# Patient Record
Sex: FEMALE | Race: WHITE
[De-identification: names, ages, dates, MRNs, and addresses within clinical notes are randomized per-mention and may not be internally consistent; named-entity substitution may affect disease eponyms.]

---

## 2018-01-25 ENCOUNTER — HOSPITAL ENCOUNTER (EMERGENCY)
Dept: HOSPITAL 80 - FED | Age: 23
Discharge: HOME | End: 2018-01-25
Payer: COMMERCIAL

## 2018-01-25 VITALS
DIASTOLIC BLOOD PRESSURE: 55 MMHG | HEART RATE: 70 BPM | TEMPERATURE: 98.4 F | RESPIRATION RATE: 16 BRPM | SYSTOLIC BLOOD PRESSURE: 117 MMHG

## 2018-01-25 VITALS — OXYGEN SATURATION: 98 %

## 2018-01-25 DIAGNOSIS — B96.20: ICD-10-CM

## 2018-01-25 DIAGNOSIS — N39.0: Primary | ICD-10-CM

## 2018-01-25 DIAGNOSIS — E86.9: ICD-10-CM

## 2018-01-25 LAB — PLATELET # BLD: 296 10^3/UL (ref 150–400)

## 2018-01-25 NOTE — EDPHY
HPI/HX/ROS/PE/MDM


Narrative: 





CHIEF COMPLAINT: RLQ abdominal pain





HPI: The patient is a 21 y/o female arriving with her friend complaining of 

progressively worsening RLQ pain onset at 03:00 this morning, about 5 hours 

ago. Her pain is sharp in quality and worse with palpation, walking, and 

coughing. The pain is always present and sometimes spikes in severity. She has 

associated nausea and constipation. She denies urinary symptoms, recent illness

, recent trauma. She is normally healthy and her last normal menstrual period 

was one week ago. Last PO intake 21:00, almost 11 hours ago. No history of 

abdominal surgeries. 





REVIEW OF SYSTEMS:


Aside from elements discussed in the HPI, a comprehensive 10-point review of 

systems was reviewed and is negative.





PMH: oral surgery, history of ovarian cyst 5-6 years ago





SOCIAL HISTORY: Friend at beside. [employer] student. From [place]. 





PHYSICAL EXAM:


General:Patient is alert, appears uncomfortable.


ENT:Eyes are normal to inspection.  ENT inspection normal.


Neck: Normal inspection.  Full range of motion.


Respiratory:No respiratory distress.  Breath sounds normal bilaterally.


Cardiovascular: Regular rate and rhythm.  Strong peripheral pulses.  Normal cap 

refill.


Abdomen:The abdomen has diffuse abdominal tenderness with severe RLQ tenderness 

to palpation. 


Back: Normal to inspection.  No tenderness to palpation.


Skin: Normal color.  No rash.  Warm and dry.


Extremities: Normal appearance.  Full range of motion.


Neuro: Oriented x3.  Normal motor function.  Normal sensory function.


ED Course: 





This is a normally healthy 21 y/o female who presents with a 5-hour history of 

acute onset RLQ abdominal pain and nausea. She has diffuse abdominal tenderness 

and severe RLQ tenderness that is concerning for acute appendicitis, though 

could also represent ovarian cyst or other process. Plan for IV, labs, 

abdominal CT, and pain management. 30mg IV Toradol and 1L IV NS administered. 





Abdominal CT is negative for acute process, appendix not visualized, but no 

secondary signs present. Moderate urothelial enhancement in the right ureter 

present. UA indicates UTI for which I have prescribed Keflex. She will receive 

1gm IV Rocephin here. Upon reassessment, she tells me this feels different than 

prior kidney infections. Will evaluate for ovarian torsion with pelvic US prior 

to discharge. 





US is negative. Reassessed patient and discussed findings. She will be 

discharged with script for Keflex and standard UTI care and follow up 

instructions. Urine has been sent for culture. Return precautions discussed. 

She is comfortable with plan for discharge. 





- Data Points


Imaging Results: 


 Imaging Impressions





Abdomen CT  01/25/18 08:52


Impression:  


1. Moderate stool in the proximal colon.


2. Mild urothelial enhancement in the right ureter, which can be seen with 

inflammation/infection.


3. Nonvisualization of the appendix with no secondary CT evidence of 

appendicitis.


 


Findings discussed with Choco Reyna MD on 1/25/2018 at 9:51 a.m.











Imaging: Discussed imaging studies w/ On call Radiologist, I viewed and 

interpreted images myself


Laboratory Results: 


 Laboratory Results





 01/25/18 07:20 





 01/25/18 07:20 





 











  01/25/18 01/25/18 01/25/18





  08:48 07:20 07:20


 


WBC      





    


 


RBC      





    


 


Hgb      





    


 


Hct      





    


 


MCV      





    


 


MCH      





    


 


MCHC      





    


 


RDW      





    


 


Plt Count      





    


 


MPV      





    


 


Neut % (Auto)      





    


 


Lymph % (Auto)      





    


 


Mono % (Auto)      





    


 


Eos % (Auto)      





    


 


Baso % (Auto)      





    


 


Nucleat RBC Rel Count      





    


 


Absolute Neuts (auto)      





    


 


Absolute Lymphs (auto)      





    


 


Absolute Monos (auto)      





    


 


Absolute Eos (auto)      





    


 


Absolute Basos (auto)      





    


 


Absolute Nucleated RBC      





    


 


Immature Gran %      





    


 


Immature Gran #      





    


 


Sodium      143 mEq/L mEq/L





     (135-145) 


 


Potassium      4.2 mEq/L mEq/L





     (3.5-5.2) 


 


Chloride      107 mEq/L mEq/L





     () 


 


Carbon Dioxide      22 mEq/l mEq/l





     (22-31) 


 


Anion Gap      14 mEq/L mEq/L





     (8-16) 


 


BUN      9 mg/dL mg/dL





     (7-23) 


 


Creatinine      0.8 mg/dL mg/dL





     (0.6-1.0) 


 


Estimated GFR      > 60 





    


 


Glucose      98 mg/dL mg/dL





     () 


 


Calcium      9.8 mg/dL mg/dL





     (8.5-10.4) 


 


Beta HCG, Qual    NEGATIVE   





    


 


Urine Color  YELLOW     





    


 


Urine Appearance  MODERATELY TURBID     





    


 


Urine pH  6.0     





   (5.0-7.5)   


 


Ur Specific Gravity  1.006     





   (1.002-1.030)   


 


Urine Protein  1+  H     





   (NEGATIVE)   


 


Urine Ketones  NEGATIVE     





   (NEGATIVE)   


 


Urine Blood  3+  H     





   (NEGATIVE)   


 


Urine Nitrate  POSITIVE  H     





   (NEGATIVE)   


 


Urine Bilirubin  NEGATIVE     





   (NEGATIVE)   


 


Urine Urobilinogen  NEGATIVE EU EU    





   (0.2-1.0)   


 


Ur Leukocyte Esterase  2+  H     





   (NEGATIVE)   


 


Urine RBC   /hpf H /hpf    





   (0-3)   


 


Urine WBC   /hpf H /hpf    





   (0-3)   


 


Ur Epithelial Cells  2+ /lpf H /lpf    





   (NONE-1+)   


 


Urine Bacteria  1+ /hpf H /hpf    





   (NONE SEEN)   


 


Urine Mucus  1+ /lpf /lpf    





   (NONE-1+)   


 


Urine Glucose  NEGATIVE     





   (NEGATIVE)   














  01/25/18





  07:20


 


WBC  9.68 10^3/uL H 10^3/uL





   (3.80-9.50) 


 


RBC  4.07 10^6/uL L 10^6/uL





   (4.18-5.33) 


 


Hgb  13.7 g/dL g/dL





   (12.6-16.3) 


 


Hct  38.5 % %





   (38.0-47.0) 


 


MCV  94.6 fL fL





   (81.5-99.8) 


 


MCH  33.7 pg pg





   (27.9-34.1) 


 


MCHC  35.6 g/dL g/dL





   (32.4-36.7) 


 


RDW  12.7 % %





   (11.5-15.2) 


 


Plt Count  296 10^3/uL 10^3/uL





   (150-400) 


 


MPV  10.1 fL fL





   (8.7-11.7) 


 


Neut % (Auto)  68.4 % %





   (39.3-74.2) 


 


Lymph % (Auto)  18.5 % %





   (15.0-45.0) 


 


Mono % (Auto)  8.2 % %





   (4.5-13.0) 


 


Eos % (Auto)  3.5 % %





   (0.6-7.6) 


 


Baso % (Auto)  1.0 % %





   (0.3-1.7) 


 


Nucleat RBC Rel Count  0.0 % %





   (0.0-0.2) 


 


Absolute Neuts (auto)  6.62 10^3/uL H 10^3/uL





   (1.70-6.50) 


 


Absolute Lymphs (auto)  1.79 10^3/uL 10^3/uL





   (1.00-3.00) 


 


Absolute Monos (auto)  0.79 10^3/uL 10^3/uL





   (0.30-0.80) 


 


Absolute Eos (auto)  0.34 10^3/uL 10^3/uL





   (0.03-0.40) 


 


Absolute Basos (auto)  0.10 10^3/uL 10^3/uL





   (0.02-0.10) 


 


Absolute Nucleated RBC  0.00 10^3/uL 10^3/uL





   (0-0.01) 


 


Immature Gran %  0.4 % %





   (0.0-1.1) 


 


Immature Gran #  0.04 10^3/uL 10^3/uL





   (0.00-0.10) 


 


Sodium  





  


 


Potassium  





  


 


Chloride  





  


 


Carbon Dioxide  





  


 


Anion Gap  





  


 


BUN  





  


 


Creatinine  





  


 


Estimated GFR  





  


 


Glucose  





  


 


Calcium  





  


 


Beta HCG, Qual  





  


 


Urine Color  





  


 


Urine Appearance  





  


 


Urine pH  





  


 


Ur Specific Gravity  





  


 


Urine Protein  





  


 


Urine Ketones  





  


 


Urine Blood  





  


 


Urine Nitrate  





  


 


Urine Bilirubin  





  


 


Urine Urobilinogen  





  


 


Ur Leukocyte Esterase  





  


 


Urine RBC  





  


 


Urine WBC  





  


 


Ur Epithelial Cells  





  


 


Urine Bacteria  





  


 


Urine Mucus  





  


 


Urine Glucose  





  











Medications Given: 


 








Discontinued Medications





Sodium Chloride (Ns)  1,000 mls @ 0 mls/hr IV ONCE ONE


   PRN Reason: Wide Open


   Stop: 01/25/18 07:34


   Last Admin: 01/25/18 07:36 Dose:  1,000 mls


Sodium Chloride (Ns)  1,000 mls @ 0 mls/hr IV EDNOW ONE; Wide Open


   PRN Reason: Protocol


   Stop: 01/25/18 08:23


   Last Admin: 01/25/18 08:34 Dose:  1,000 mls


Ketorolac Tromethamine (Toradol)  30 mg IVP EDNOW ONE


   Stop: 01/25/18 08:23


   Last Admin: 01/25/18 08:29 Dose:  30 mg








General


Time Seen by Provider: 01/25/18 07:38


Initial Vital Signs: 


 Initial Vital Signs











Temperature (C)  36.8 C   01/25/18 07:01


 


Heart Rate  85   01/25/18 07:01


 


Respiratory Rate  18   01/25/18 07:01


 


Blood Pressure  131/79 H  01/25/18 07:01


 


O2 Sat (%)  98   01/25/18 07:01








 











O2 Delivery Mode               Room Air














Allergies/Adverse Reactions: 


 





No Known Allergies Allergy (Unverified 01/25/18 07:01)


 











Departure





- Departure


Disposition: Home, Routine, Self-Care


Clinical Impression: 


Abdominal pain


Qualifiers:


 Abdominal location: right lower quadrant Qualified Code(s): R10.31 - Right 

lower quadrant pain





Urinary tract infection


Qualifiers:


 Urinary tract infection type: site unspecified Hematuria presence: with 

hematuria Qualified Code(s): N39.0 - Urinary tract infection, site not specified





Condition: Good


Instructions:  Cephalexin (By mouth), Constipation (ED), Urinary Tract 

Infection in Women (ED), Acute Abdominal Pain (ED)


Additional Instructions: 


1. Take Keflex as prescribed for UTI. Be sure to complete the entire 

prescription. 


2. Use Tylenol and ibuprofen as directed for pain or fever. 


3. Sometimes we are unable to diagnose an obvious cause of abdominal pain in 

the Emergency Department. Because more serious conditions can be difficult to 

diagnose early in the course of their presentation, we ask that you return to 

the Emergency Department in 8-12 hours for a recheck if you are still having 

pain. This is necessary to exclude the development of a more serious condition 

such as appendicitis or other intra-abdominal emergency. In the event your pain 

markedly increases before that time or you develop intractable vomiting or 

fever return to the Emergency Department immediately.








Adult Pain & Fever Control:


We recommend Acetaminophen (Tylenol) and Ibuprofen (Motrin,Advil) for pain and 

fever control.  When fever is high or pain severe, both drugs can be used at 

the same time, but at different intervals.  Please note the time differences.  


Your dose is:     Acetaminophen 650mg every 4 to 6 hours


        Ibuprofen 600mg every 6-8 hours with food  


Note:  do not take Acetaminophen with Hydrocodone (Vicodin, Lortab) or 

Oxycodone (Percocet).  These medications also contain Acetaminophen.  No more 

than 3000mg of Acetaminophen should be taken in 24 hours (for an adult).


Referrals: 


[facility],. [Clinic] - As per Instructions


Report Scribed for: Choco Reyna


Report Scribed by: Erin Anderson


Date of Report: 01/25/18


Time of Report: 07:47


Physician Review and Approval Statement: Portions of this note were transcribed 

by an ED scribe.  I personally performed the history, physical exam, and 

medical decision making; and confirm the accuracy of the information in the 

transcribed note.